# Patient Record
Sex: FEMALE | Race: WHITE
[De-identification: names, ages, dates, MRNs, and addresses within clinical notes are randomized per-mention and may not be internally consistent; named-entity substitution may affect disease eponyms.]

---

## 2020-06-16 ENCOUNTER — HOSPITAL ENCOUNTER (EMERGENCY)
Dept: HOSPITAL 94 - ER | Age: 85
Discharge: HOME | End: 2020-06-16
Payer: MEDICARE

## 2020-06-16 VITALS — HEIGHT: 64 IN | BODY MASS INDEX: 42.77 KG/M2 | WEIGHT: 250.53 LBS

## 2020-06-16 VITALS — SYSTOLIC BLOOD PRESSURE: 146 MMHG | DIASTOLIC BLOOD PRESSURE: 86 MMHG

## 2020-06-16 DIAGNOSIS — Z88.8: ICD-10-CM

## 2020-06-16 DIAGNOSIS — T36.8X5A: ICD-10-CM

## 2020-06-16 DIAGNOSIS — E11.9: ICD-10-CM

## 2020-06-16 DIAGNOSIS — G89.29: ICD-10-CM

## 2020-06-16 DIAGNOSIS — Z98.890: ICD-10-CM

## 2020-06-16 DIAGNOSIS — Z79.899: ICD-10-CM

## 2020-06-16 DIAGNOSIS — J44.9: ICD-10-CM

## 2020-06-16 DIAGNOSIS — I48.91: ICD-10-CM

## 2020-06-16 DIAGNOSIS — I10: ICD-10-CM

## 2020-06-16 DIAGNOSIS — Z88.2: ICD-10-CM

## 2020-06-16 DIAGNOSIS — R21: Primary | ICD-10-CM

## 2020-06-16 PROCEDURE — 96376 TX/PRO/DX INJ SAME DRUG ADON: CPT

## 2020-06-16 PROCEDURE — 96375 TX/PRO/DX INJ NEW DRUG ADDON: CPT

## 2020-06-16 PROCEDURE — 99284 EMERGENCY DEPT VISIT MOD MDM: CPT

## 2020-06-16 PROCEDURE — 96374 THER/PROPH/DIAG INJ IV PUSH: CPT

## 2020-08-09 ENCOUNTER — HOSPITAL ENCOUNTER (EMERGENCY)
Dept: HOSPITAL 94 - ER | Age: 85
Discharge: HOME | End: 2020-08-09
Payer: MEDICARE

## 2020-08-09 VITALS — SYSTOLIC BLOOD PRESSURE: 156 MMHG | DIASTOLIC BLOOD PRESSURE: 75 MMHG

## 2020-08-09 VITALS — WEIGHT: 250 LBS | BODY MASS INDEX: 42.68 KG/M2 | HEIGHT: 64 IN

## 2020-08-09 DIAGNOSIS — Z98.890: ICD-10-CM

## 2020-08-09 DIAGNOSIS — S42.201A: ICD-10-CM

## 2020-08-09 DIAGNOSIS — Z88.2: ICD-10-CM
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
## 2020-08-09 DIAGNOSIS — J44.9: ICD-10-CM

## 2020-08-09 DIAGNOSIS — I10: ICD-10-CM
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
## 2020-08-09 DIAGNOSIS — Z86.19: ICD-10-CM

## 2020-08-09 DIAGNOSIS — Z88.8: ICD-10-CM

## 2020-08-09 DIAGNOSIS — E11.9: ICD-10-CM

## 2020-08-09 DIAGNOSIS — I48.91: ICD-10-CM

## 2020-08-09 DIAGNOSIS — S43.004A: ICD-10-CM

## 2020-08-09 DIAGNOSIS — Z79.899: ICD-10-CM

## 2020-08-09 DIAGNOSIS — Z79.01: ICD-10-CM
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
## 2020-08-09 DIAGNOSIS — S52.501A: Primary | ICD-10-CM
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
## 2020-08-09 PROCEDURE — 99285 EMERGENCY DEPT VISIT HI MDM: CPT

## 2020-08-09 PROCEDURE — 73030 X-RAY EXAM OF SHOULDER: CPT

## 2020-08-09 PROCEDURE — 96376 TX/PRO/DX INJ SAME DRUG ADON: CPT

## 2020-08-09 PROCEDURE — 73110 X-RAY EXAM OF WRIST: CPT

## 2020-08-09 PROCEDURE — 94760 N-INVAS EAR/PLS OXIMETRY 1: CPT

## 2020-08-09 PROCEDURE — 23675 CLTX SHO DISLC NECK FX MNPJ: CPT

## 2020-08-09 PROCEDURE — 73020 X-RAY EXAM OF SHOULDER: CPT

## 2020-08-09 PROCEDURE — 99152 MOD SED SAME PHYS/QHP 5/>YRS: CPT

## 2020-08-09 PROCEDURE — 23650 CLTX SHO DSLC W/MNPJ WO ANES: CPT

## 2020-08-09 PROCEDURE — 96374 THER/PROPH/DIAG INJ IV PUSH: CPT

## 2020-08-09 PROCEDURE — 96375 TX/PRO/DX INJ NEW DRUG ADDON: CPT

## 2020-08-09 NOTE — NUR
Spoke with daughter re pt.  Info regarding aftercare provided over the phone.  
Daughter will set up room for pt at her residence that will be accessible.

## 2023-09-22 ENCOUNTER — HOSPITAL ENCOUNTER (OUTPATIENT)
Dept: HOSPITAL 94 - 64 CT | Age: 88
Discharge: HOME | End: 2023-09-22
Attending: STUDENT IN AN ORGANIZED HEALTH CARE EDUCATION/TRAINING PROGRAM
Payer: MEDICARE

## 2023-09-22 DIAGNOSIS — I48.91: ICD-10-CM

## 2023-09-22 DIAGNOSIS — I48.92: ICD-10-CM

## 2023-09-22 DIAGNOSIS — I25.10: Primary | ICD-10-CM

## 2023-09-22 LAB
ALBUMIN SERPL BCP-MCNC: 3.1 G/DL (ref 3.4–5)
ALBUMIN/GLOB SERPL: 0.9 {RATIO} (ref 1.1–1.5)
ALP SERPL-CCNC: 86 IU/L (ref 46–116)
ALT SERPL W P-5'-P-CCNC: 16 U/L (ref 12–78)
ANION GAP SERPL CALCULATED.3IONS-SCNC: 8 MMOL/L (ref 8–16)
APTT PPP: 30 SECONDS (ref 22–32)
AST SERPL W P-5'-P-CCNC: 19 U/L (ref 10–37)
BASOPHILS # BLD AUTO: 0 X10'3 (ref 0–0.2)
BASOPHILS NFR BLD AUTO: 1.1 % (ref 0–1)
BILIRUB SERPL-MCNC: 1.1 MG/DL (ref 0.1–1)
BUN SERPL-MCNC: 13 MG/DL (ref 7–18)
BUN/CREAT SERPL: 20 (ref 10–20)
CALCIUM SERPL-MCNC: 8.9 MG/DL (ref 8.5–10.1)
CHLORIDE SERPL-SCNC: 98 MMOL/L (ref 99–107)
CO2 SERPL-SCNC: 26.4 MMOL/L (ref 24–32)
CREAT CL PREDICTED SERPL C-G-VRATE: (no result) ML/MIN
CREAT SERPL-MCNC: 0.65 MG/DL (ref 0.4–0.9)
EOSINOPHIL # BLD AUTO: 0 X10'3 (ref 0–0.9)
EOSINOPHIL NFR BLD AUTO: 0.3 % (ref 0–6)
ERYTHROCYTE [DISTWIDTH] IN BLOOD BY AUTOMATED COUNT: 14.9 % (ref 11.5–14.5)
GFR SERPL CREATININE-BSD FRML MDRD: 86 ML/MIN
GLOBULIN SER CALC-MCNC: 3.6 G/DL (ref 2.7–4.3)
GLUCOSE SERPL-MCNC: 83 MG/DL (ref 70–104)
HCT VFR BLD AUTO: 36.8 % (ref 35–45)
HGB BLD-MCNC: 12.3 G/DL (ref 12–16)
INR PPP: 1.1 INR
LYMPHOCYTES # BLD AUTO: 0.9 X10'3 (ref 1.1–4.8)
LYMPHOCYTES NFR BLD AUTO: 19.1 % (ref 21–51)
MCH RBC QN AUTO: 33.8 PG (ref 27–31)
MCHC RBC AUTO-ENTMCNC: 33.4 G/DL (ref 33–36.5)
MCV RBC AUTO: 101.3 FL (ref 78–98)
MONOCYTES # BLD AUTO: 0.2 X10'3 (ref 0–0.9)
MONOCYTES NFR BLD AUTO: 5.4 % (ref 2–12)
NEUTROPHILS # BLD AUTO: 3.4 X10'3 (ref 1.8–7.7)
NEUTROPHILS NFR BLD AUTO: 74.1 % (ref 42–75)
PLATELET # BLD AUTO: 195 X10'3 (ref 140–440)
PMV BLD AUTO: 9.1 FL (ref 7.4–10.4)
POTASSIUM SERPL-SCNC: 3.3 MMOL/L (ref 3.5–5.1)
PROT SERPL-MCNC: 6.7 G/DL (ref 6.4–8.2)
PROTHROMBIN TIME: 11.8 SECONDS (ref 9–12)
RBC # BLD AUTO: 3.63 X10'6 (ref 4.2–5.6)
SODIUM SERPL-SCNC: 132 MMOL/L (ref 135–145)
WBC # BLD AUTO: 4.5 X10'3 (ref 4.5–11)

## 2023-09-22 PROCEDURE — 85025 COMPLETE CBC W/AUTO DIFF WBC: CPT

## 2023-09-22 PROCEDURE — 80053 COMPREHEN METABOLIC PANEL: CPT

## 2023-09-22 PROCEDURE — 85730 THROMBOPLASTIN TIME PARTIAL: CPT

## 2023-09-22 PROCEDURE — 75572 CT HRT W/3D IMAGE: CPT

## 2023-09-22 PROCEDURE — 36415 COLL VENOUS BLD VENIPUNCTURE: CPT

## 2023-09-22 PROCEDURE — 85610 PROTHROMBIN TIME: CPT

## 2025-06-24 ENCOUNTER — HOSPITAL ENCOUNTER (EMERGENCY)
Dept: HOSPITAL 94 - ER | Age: 89
Discharge: HOME | End: 2025-06-24
Payer: MEDICARE

## 2025-06-24 VITALS
OXYGEN SATURATION: 98 % | SYSTOLIC BLOOD PRESSURE: 179 MMHG | RESPIRATION RATE: 16 BRPM | DIASTOLIC BLOOD PRESSURE: 115 MMHG | HEART RATE: 85 BPM

## 2025-06-24 VITALS — HEIGHT: 64 IN | WEIGHT: 190.39 LBS | BODY MASS INDEX: 32.5 KG/M2

## 2025-06-24 VITALS — TEMPERATURE: 97.6 F

## 2025-06-24 DIAGNOSIS — E11.9: ICD-10-CM

## 2025-06-24 DIAGNOSIS — R07.81: Primary | ICD-10-CM

## 2025-06-24 DIAGNOSIS — Z79.899: ICD-10-CM

## 2025-06-24 DIAGNOSIS — J44.9: ICD-10-CM

## 2025-06-24 DIAGNOSIS — Z79.01: ICD-10-CM

## 2025-06-24 DIAGNOSIS — Z88.1: ICD-10-CM

## 2025-06-24 DIAGNOSIS — I48.91: ICD-10-CM

## 2025-06-24 DIAGNOSIS — I10: ICD-10-CM

## 2025-06-24 DIAGNOSIS — Z88.2: ICD-10-CM

## 2025-06-24 DIAGNOSIS — Z79.84: ICD-10-CM

## 2025-06-24 PROCEDURE — 96372 THER/PROPH/DIAG INJ SC/IM: CPT

## 2025-06-24 PROCEDURE — 99284 EMERGENCY DEPT VISIT MOD MDM: CPT

## 2025-06-24 PROCEDURE — 71045 X-RAY EXAM CHEST 1 VIEW: CPT

## 2025-06-24 PROCEDURE — 93005 ELECTROCARDIOGRAM TRACING: CPT

## 2025-06-24 RX ADMIN — ACETAMINOPHEN ONE MG: 325 TABLET ORAL at 05:31

## 2025-06-24 RX ADMIN — KETOROLAC TROMETHAMINE ONE MG: 15 INJECTION, SOLUTION INTRAMUSCULAR; INTRAVENOUS at 05:30
